# Patient Record
Sex: MALE | Race: WHITE | NOT HISPANIC OR LATINO | URBAN - METROPOLITAN AREA
[De-identification: names, ages, dates, MRNs, and addresses within clinical notes are randomized per-mention and may not be internally consistent; named-entity substitution may affect disease eponyms.]

---
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
## 2020-02-22 ENCOUNTER — EMERGENCY (EMERGENCY)
Facility: HOSPITAL | Age: 20
LOS: 0 days | Discharge: HOME | End: 2020-02-22
Attending: EMERGENCY MEDICINE | Admitting: EMERGENCY MEDICINE
Payer: MEDICAID

## 2020-02-22 VITALS
DIASTOLIC BLOOD PRESSURE: 73 MMHG | TEMPERATURE: 99 F | WEIGHT: 0.16 LBS | OXYGEN SATURATION: 99 % | HEART RATE: 78 BPM | RESPIRATION RATE: 17 BRPM | SYSTOLIC BLOOD PRESSURE: 129 MMHG

## 2020-02-22 DIAGNOSIS — K08.89 OTHER SPECIFIED DISORDERS OF TEETH AND SUPPORTING STRUCTURES: ICD-10-CM

## 2020-02-22 DIAGNOSIS — K04.7 PERIAPICAL ABSCESS WITHOUT SINUS: ICD-10-CM

## 2020-02-22 PROBLEM — Z78.9 OTHER SPECIFIED HEALTH STATUS: Chronic | Status: ACTIVE | Noted: 2019-08-26

## 2020-02-22 PROCEDURE — 99282 EMERGENCY DEPT VISIT SF MDM: CPT

## 2020-02-22 NOTE — ED PROVIDER NOTE - NSFOLLOWUPCLINICS_GEN_ALL_ED_FT
[facility]  Dental  [address] [place] [id number]  Phone: [phone]  Fax:   Follow Up Time: 1-3 Days

## 2020-02-22 NOTE — ED PROVIDER NOTE - NSFOLLOWUPINSTRUCTIONS_ED_ALL_ED_FT
Dental Pain  Dental pain may be caused by many things, including:    Tooth decay (cavities or caries). Cavities expose the nerve of your tooth to air and hot or cold temperatures. This can cause pain or discomfort.  Abscess or infection. A dental abscess is a collection of infected pus from a bacterial infection in the inner part of the tooth (pulp). It usually occurs at the end of the tooth’s root.  Injury.  An unknown reason (idiopathic).    Your pain may be mild or severe. It may only occur when:    You are chewing.  You are exposed to hot or cold temperature.  You are eating or drinking sugary foods or beverages, such as soda or candy.    Your pain may also be constant.    Follow these instructions at home:  Watch your dental pain for any changes. The following actions may help to lessen any discomfort that you are feeling:    Take medicines only as directed by your dentist.  If you were prescribed an antibiotic medicine, finish all of it even if you start to feel better.  Keep all follow-up visits as directed by your dentist. This is important.  Do not apply heat to the outside of your face.  Rinse your mouth or gargle with salt water if directed by your dentist. This helps with pain and swelling.    You can make salt water by adding ¼ tsp of salt to 1 cup of warm water.    Apply ice to the painful area of your face:    Put ice in a plastic bag.  Place a towel between your skin and the bag.  Leave the ice on for 20 minutes, 2–3 times per day.    Avoid foods or drinks that cause you pain, such as:    Very hot or very cold foods or drinks.  Sweet or sugary foods or drinks.      Contact a health care provider if:  Your pain is not controlled with medicines.  Your symptoms are worse.  You have new symptoms.  Get help right away if:  You are unable to open your mouth.  You are having trouble breathing or swallowing.  You have a fever.  Your face, neck, or jaw is swollen.  This information is not intended to replace advice given to you by your health care provider. Make sure you discuss any questions you have with your health care provider.

## 2020-02-22 NOTE — ED PROVIDER NOTE - PATIENT PORTAL LINK FT
You can access the FollowMyHealth Patient Portal offered by [facility] by registering at the following website: [email or web address]. By joining [employer]’s FollowMyHealth portal, you will also be able to view your health information using other applications (apps) compatible with our system.

## 2020-02-22 NOTE — ED PROVIDER NOTE - PHYSICAL EXAMINATION
CONSTITUTIONAL: WA / WN / NAD  HEAD: NCAT  EYES: PERRL; EOMI;   ENT: Normal pharynx; mucous membranes pink/moist, no erythema. + crown on tooth #19 no induration or fluctuance  NECK: Supple;  SKIN: Warm and dry;   NEURO: AAOx3  PSYCH: Memory Intact, Normal Affect

## 2020-02-22 NOTE — ED PROVIDER NOTE - OBJECTIVE STATEMENT
19 year old male presents here for dental pain. patient had a crown placed but was seen by a dentist yesterday and told he needs a root canal. Mother was unable to make an appointment and came here. denies any fever or chills.

## 2020-02-22 NOTE — ED PROVIDER NOTE - CLINICAL SUMMARY MEDICAL DECISION MAKING FREE TEXT BOX
ATTENDING NOTE:   20 y/o M  had a crown at tooth 19 and couldn’t find an endodontist to do a root canal today so came to ED. Only taking 1 tablet of Motrin every 5 hours. No f/c, or other signs of infection at this time.   Gen: NAD, Teeth: (+) Crown in place of tooth 19, no surrounding erythema, edema or TTP.   DX: Dental issue, will DC home with endodontist follow up outpatient. Given return precautions. ATTENDING NOTE:   18 y/o M  had a crown at tooth 19 and couldn’t find an endodontist to do a root canal today so came to ED. Only taking 1 tablet of Motrin every 5 hours. No f/c, or other signs of infection at this time. Exam - Gen: NAD, Teeth: (+) Crown in place of tooth 19, no surrounding gingival erythema, edema or TTP. DX: Dental issue, will DC home with endodontist follow up outpatient. Given return precautions.

## 2020-02-22 NOTE — ED PEDIATRIC NURSE NOTE - NSIMPLEMENTINTERV_GEN_ALL_ED
Implemented All Universal Safety Interventions:  [place] to call system. Call bell, personal items and telephone within reach. Instruct patient to call for assistance. Room bathroom lighting operational. Non-slip footwear when patient is off stretcher. Physically safe environment: no spills, clutter or unnecessary equipment. Stretcher in lowest position, wheels locked, appropriate side rails in place.

## 2022-12-05 NOTE — ED PEDIATRIC TRIAGE NOTE - CCCP TRG CHIEF CMPLNT
dental pain/injury
Patient requests all Lab, Cardiology, and Radiology Results on their Discharge Instructions
